# Patient Record
Sex: FEMALE | Race: BLACK OR AFRICAN AMERICAN | NOT HISPANIC OR LATINO | URBAN - NONMETROPOLITAN AREA
[De-identification: names, ages, dates, MRNs, and addresses within clinical notes are randomized per-mention and may not be internally consistent; named-entity substitution may affect disease eponyms.]

---

## 2017-12-28 ENCOUNTER — OFFICE (OUTPATIENT)
Dept: URBAN - NONMETROPOLITAN AREA CLINIC 13 | Facility: CLINIC | Age: 52
End: 2017-12-28

## 2017-12-28 ENCOUNTER — OFFICE (OUTPATIENT)
Dept: URBAN - NONMETROPOLITAN AREA CLINIC 13 | Facility: CLINIC | Age: 52
End: 2017-12-28
Payer: COMMERCIAL

## 2017-12-28 VITALS
HEIGHT: 64 IN | HEART RATE: 67 BPM | DIASTOLIC BLOOD PRESSURE: 78 MMHG | WEIGHT: 217 LBS | SYSTOLIC BLOOD PRESSURE: 136 MMHG

## 2017-12-28 VITALS — HEIGHT: 64 IN

## 2017-12-28 DIAGNOSIS — Z01.812 ENCOUNTER FOR PREPROCEDURAL LABORATORY EXAMINATION: ICD-10-CM

## 2017-12-28 DIAGNOSIS — Z12.11 ENCOUNTER FOR SCREENING FOR MALIGNANT NEOPLASM OF COLON: ICD-10-CM

## 2017-12-28 DIAGNOSIS — I10 ESSENTIAL (PRIMARY) HYPERTENSION: ICD-10-CM

## 2017-12-28 LAB
CBC SYSMEX: HEMATOCRIT: 33.3 % — LOW (ref 37–47)
CBC SYSMEX: HEMOGLOBIN: 10.3 G/DL — LOW (ref 12–14)
CBC SYSMEX: LYMPHS %: 34.1 % (ref 20.5–40.5)
CBC SYSMEX: LYMPHS ABSOLUTE: 2 10*3U/L (ref 1.3–2.9)
CBC SYSMEX: MCH: 23.1 PG — LOW (ref 27–32)
CBC SYSMEX: MCHC: 30.9 G/DL (ref 28.5–35)
CBC SYSMEX: MCV: 74.7 FL — LOW (ref 84–100)
CBC SYSMEX: MONOS %: 15.5 % — HIGH (ref 2–10)
CBC SYSMEX: MONOS ABSOLUTE: 0.9 10*3U/L (ref 0.3–3.8)
CBC SYSMEX: MPV: 11.6 FL — HIGH (ref 7.4–10.4)
CBC SYSMEX: NEUT. %: 50.4 % (ref 43–65)
CBC SYSMEX: NEUT. ABSOLUTE: 3.1 10*3U/L (ref 2.2–4.8)
CBC SYSMEX: PLATELETS: 266 10*3U/L (ref 130–440)
CBC SYSMEX: RBC: 4.5 10*6U/L (ref 4.2–5.4)
CBC SYSMEX: RDW: 14.4 % (ref 11.5–15.5)
CBC SYSMEX: WBC: 6 10*3U/L (ref 4.5–10.5)
COMPLETE METABOLIC: ALBUMIN: 4.1 G/DL (ref 3.5–5.2)
COMPLETE METABOLIC: ALK. PHOS: 70 U/L (ref 40–150)
COMPLETE METABOLIC: ALT: 11 U/L (ref 0–55)
COMPLETE METABOLIC: AST: 14 U/L (ref 5–34)
COMPLETE METABOLIC: BUN: 15 MG/DL (ref 7–26)
COMPLETE METABOLIC: CALCIUM: 9.1 MG/DL (ref 8.4–10.2)
COMPLETE METABOLIC: CHLORIDE: 105 MMOL/L (ref 98–107)
COMPLETE METABOLIC: CO2: 28 MEQ/L (ref 22–29)
COMPLETE METABOLIC: CREATININE: 0.63 MG/DL (ref 0.57–1.25)
COMPLETE METABOLIC: GFR - AFRICAN AMERICAN: 128.3 (ref 59–200)
COMPLETE METABOLIC: GFR - NON AFRICAN AMERICAN: 105.9 (ref 59–200)
COMPLETE METABOLIC: GLUCOSE: 95 MG/DL (ref 70–99)
COMPLETE METABOLIC: POTASSIUM: 3.5 MMOL/L (ref 3.5–5.3)
COMPLETE METABOLIC: SODIUM: 138 MMOL/L (ref 136–145)
COMPLETE METABOLIC: TOTAL BILIRUBIN: 0.6 MG/DL (ref 0.2–1.2)
COMPLETE METABOLIC: TOTAL PROTEIN: 6.7 G/DL (ref 6.4–8.3)

## 2017-12-28 PROCEDURE — 85025 COMPLETE CBC W/AUTO DIFF WBC: CPT | Performed by: INTERNAL MEDICINE

## 2017-12-28 PROCEDURE — 36415 COLL VENOUS BLD VENIPUNCTURE: CPT | Performed by: INTERNAL MEDICINE

## 2017-12-28 PROCEDURE — 80053 COMPREHEN METABOLIC PANEL: CPT | Performed by: INTERNAL MEDICINE

## 2017-12-28 PROCEDURE — 99202 OFFICE O/P NEW SF 15 MIN: CPT | Performed by: INTERNAL MEDICINE

## 2017-12-28 RX ORDER — POLYETHYLENE GLYCOL 3350, SODIUM SULFATE ANHYDROUS, SODIUM BICARBONATE, SODIUM CHLORIDE, POTASSIUM CHLORIDE 236; 22.74; 6.74; 5.86; 2.97 G/4L; G/4L; G/4L; G/4L; G/4L
POWDER, FOR SOLUTION ORAL
Qty: 1 | Refills: 0 | Status: COMPLETED
Start: 2017-12-28 | End: 2018-01-29

## 2017-12-28 RX ORDER — BISACODYL 5 MG
TABLET, DELAYED RELEASE (ENTERIC COATED) ORAL
Qty: 8 | Refills: 0 | Status: COMPLETED
Start: 2017-12-28 | End: 2018-01-29

## 2017-12-28 RX ORDER — ONDANSETRON HYDROCHLORIDE 4 MG/1
TABLET, FILM COATED ORAL
Qty: 2 | Refills: 0 | Status: COMPLETED
Start: 2017-12-28 | End: 2018-01-29

## 2018-01-29 ENCOUNTER — AMBULATORY SURGICAL CENTER (OUTPATIENT)
Dept: URBAN - NONMETROPOLITAN AREA SURGERY 2 | Facility: SURGERY | Age: 53
End: 2018-01-29

## 2018-01-29 ENCOUNTER — AMBULATORY SURGICAL CENTER (OUTPATIENT)
Dept: URBAN - NONMETROPOLITAN AREA SURGERY 2 | Facility: SURGERY | Age: 53
End: 2018-01-29
Payer: COMMERCIAL

## 2018-01-29 VITALS
RESPIRATION RATE: 18 BRPM | SYSTOLIC BLOOD PRESSURE: 137 MMHG | HEIGHT: 64 IN | SYSTOLIC BLOOD PRESSURE: 161 MMHG | SYSTOLIC BLOOD PRESSURE: 132 MMHG | RESPIRATION RATE: 20 BRPM | WEIGHT: 217 LBS | DIASTOLIC BLOOD PRESSURE: 78 MMHG | HEART RATE: 55 BPM | DIASTOLIC BLOOD PRESSURE: 95 MMHG | OXYGEN SATURATION: 95 % | OXYGEN SATURATION: 100 % | HEART RATE: 65 BPM | OXYGEN SATURATION: 99 % | DIASTOLIC BLOOD PRESSURE: 97 MMHG | SYSTOLIC BLOOD PRESSURE: 172 MMHG | SYSTOLIC BLOOD PRESSURE: 166 MMHG | HEART RATE: 64 BPM | HEART RATE: 58 BPM | RESPIRATION RATE: 16 BRPM | DIASTOLIC BLOOD PRESSURE: 85 MMHG | HEART RATE: 68 BPM | SYSTOLIC BLOOD PRESSURE: 139 MMHG | HEART RATE: 75 BPM | DIASTOLIC BLOOD PRESSURE: 91 MMHG

## 2018-01-29 DIAGNOSIS — Z12.11 ENCOUNTER FOR SCREENING FOR MALIGNANT NEOPLASM OF COLON: ICD-10-CM

## 2018-01-29 PROBLEM — D64.9 ANEMIA OF UNKNOWN ETIOLOGY: Status: ACTIVE | Noted: 2018-01-29

## 2018-01-29 PROCEDURE — G0121 COLON CA SCRN NOT HI RSK IND: HCPCS | Performed by: INTERNAL MEDICINE

## 2018-01-29 PROCEDURE — 00812 ANES LWR INTST SCR COLSC: CPT

## 2021-01-07 NOTE — SERVICENOTES
CBC CMP [Feeling Poorly] : feeling poorly [Poor Coordination] : poor coordination [Difficulty Walking] : difficulty walking [Negative] : Heme/Lymph